# Patient Record
Sex: MALE | Race: WHITE | Employment: OTHER | URBAN - METROPOLITAN AREA
[De-identification: names, ages, dates, MRNs, and addresses within clinical notes are randomized per-mention and may not be internally consistent; named-entity substitution may affect disease eponyms.]

---

## 2017-01-19 ENCOUNTER — HOSPITAL ENCOUNTER (OUTPATIENT)
Dept: PHYSICAL THERAPY | Age: 57
Discharge: HOME | End: 2017-01-19
Attending: FAMILY MEDICINE
Payer: MEDICARE

## 2017-01-19 DIAGNOSIS — M25.511 RIGHT SHOULDER PAIN, UNSPECIFIED CHRONICITY: ICD-10-CM

## 2017-01-19 PROCEDURE — G8985 CARRY GOAL STATUS: HCPCS

## 2017-01-19 PROCEDURE — 97161 PT EVAL LOW COMPLEX 20 MIN: CPT

## 2017-01-19 PROCEDURE — 97110 THERAPEUTIC EXERCISES: CPT

## 2017-01-19 PROCEDURE — G8984 CARRY CURRENT STATUS: HCPCS

## 2017-01-19 NOTE — PROGRESS NOTES
Ambulatory/Rehab Services H2 Model Falls Risk Assessment    Risk Factor Pts. ·   Confusion/Disorientation/Impulsivity  []    4 ·   Symptomatic Depression  []   2 ·   Altered Elimination  []   1 ·   Dizziness/Vertigo  []   1 ·   Gender (Male)  []   1 ·   Any administered antiepileptics (anticonvulsants):  []   2 ·   Any administered benzodiazepines:  []   1 ·   Visual Impairment (specify):  []   1 ·   Portable Oxygen Use  []   1 ·   Orthostatic ? BP  []   1 ·   History of Recent Falls (within 3 mos.)  [x]   5     Ability to Rise from Chair (choose one) Pts. ·   Ability to rise in a single movement  [x]   0 ·   Pushes up, successful in one attempt  []   1 ·   Multiple attempts, but successful  []   3 ·   Unable to rise without assistance  []   4   Total: (5 or greater = High Risk) 5     Falls Prevention Plan:   [x]                Physical Limitations to Exercise (specify): safe position for home exercise program    []                Mobility Assistance Device (type):   []                Exercise/Equipment Adaptation (specify):    ©2010 [facility] [address] [place] [address] Patent #[id number].  Federal Law prohibits the replication, distribution or use without written permission from [facility] of [address] [place] [address]  1/19/2017

## 2017-01-19 NOTE — PROGRESS NOTES
Rudy Héctor  : 1960 [facility] at [address]  [phone]   [phone]        OUTPATIENT PHYSICAL THERAPY:Initial Assessment 2017    ICD-10: Treatment Diagnosis: pain in right shoulder (M25.511)  Strain of unspecified muscle, fascia, and tendon at shoulder and upper arm level, right arm, sequela (B55.564S)  Precautions/Allergies:   Codeine   Fall Risk Score: 5 (? 5 = High Risk)  MD Orders: Evaluate and treat MEDICAL/REFERRING DIAGNOSIS:  Right shoulder pain, unspecified chronicity [M25.511]   DATE OF ONSET: 16  REFERRING PHYSICIAN: aMrcella Mercedes MD  RETURN PHYSICIAN APPOINTMENT: 17   Patient has attended 1 physical therapy session including initial evaluation. INITIAL ASSESSMENT:  Mr. Claudette Gunner presents with increased pain, decreased active and passive range of motion and decreased strength right shoulder status post fall on outstretched arm. He presents with postural deviations listed below but most significant - decreased right scapula upward rotation mobility. He presents with decreased independence with activities of daily living and performing overhead activities. He would benefit from skilled physical therapy in order to restore prior level of function and prevent future impairment. PROBLEM LIST (Impacting functional limitations):  1. Decreased Strength  2. Decreased ADL/Functional Activities  3. Increased Pain  4. Decreased Activity Tolerance  5. Decreased Flexibility/Joint Mobility  6. Decreased [place] with Home Exercise Program INTERVENTIONS PLANNED:  1. Bed Mobility  2. Cold  3. Electrical Stimulation  4. Family Education  5. Heat  6. Home Exercise Program (HEP)  7. Manual Therapy  8. Neuromuscular Re-education/Strengthening  9. Range of Motion (ROM)  10. Therapeutic Activites  11. Therapeutic Exercise/Strengthening  12.  Ultrasound (US)   TREATMENT PLAN:  Effective Dates: 17 TO 4/19/17. Frequency/Duration: 1 time a week for 6 weeks  GOALS: (Goals have been discussed and agreed upon with patient.)  Discharge Goals: Time Frame: 6 weeks  1. Patient will be independent with home exercise program within 6 weeks in order to improve independence with management of patient's symptoms and/or functional deficits. 2. Patient will improve their Disabilities of the Arm, Shoulder, and Hand questionnaire score by 15 scale points (23/55) within 6 weeks in order to demonstrate a minimally clinically important difference with improved function during instrumental activities of daily living. 3. Patient will improve pain free active range of motion right shoulder to 120 degrees forward elevation within 6 weeks in order to restore prior level of function. Rehabilitation Potential For Stated Goals: Good  Regarding Kaylah Anaya's therapy, I certify that the treatment plan above will be carried out by a therapist or under their direction. Thank you for this referral,  Ninfa Gunn PT     Referring Physician Signature: Stefanie Zimmer MD              Date                    The information in this section was collected on 1/19/17 (except where otherwise noted). HISTORY:   History of Present Injury/Illness (Reason for Referral):  Jag Piña notes he was walking in [place] [address] when he tripped over vine and fell on outstretched arm right. He notes pain and decreased mobility right shoulder since fall. Past Medical History/Comorbidities: back pain, hepatitis C, essential hypertension  Social History/Living Environment:   Jag Piña lives with his wife  Prior Level of Function/Work/Activity:  Jag Piña is not currently working, he enjoys shooting his guns  Dominant Side:         RIGHT  Personal Factors:          Sex:  male        Age:  61 y.o.   Current Medications:  Naproxen, norco, chantix, restoril, hydrodiuril, harvoni, tetanus-diphatheria toxids   Date Last Reviewed:  1/19/2017     Number of Personal Factors/Comorbidities that affect the Plan of Care: 0: LOW COMPLEXITY   EXAMINATION:   Observation/Orthostatic Postural Assessment:  Assessed 1/19/16        Francisco Stair sits with forward head and rounded shoulders which indicate tight anterior chest musculature, upper trapezius, and levator scapula and weak posterior scapula musculature and deep cervical flexors. Palpation:  Assessed 1/19/17         Francisco Stair is not tender to palpate right shoulder, scapula musculature,   ROM:  Assessed 1/19/27        Right shoulder active range of motion  Flexion 104 degrees  Abduction 80 degrees  External rotation 45 degrees with elbow by side    Right shoulder passive range of motion  Flexion 120 degrees  External rotation with arm abducted 45 degrees - 60 degrees    Left shoulder active range of motion  Flexion 150 degrees  Abduction 160 degrees  External rotation 70 degrees with elbow by side  Strength:  Assessed 1/19/17        4-/5 right shoulder all major muscle groups within available range of motion - pain with shoulder flexion, abduction and internal rotation   5/5 left shoulder all major muscle groups  Special Tests:  Assessed 1/19/17        spurlings - negative neutral, right, left    Positive right/negative left  Bess pricila  Neers  Painful arc  Biceps load  Empty can  Full can    Negative bilaterally  Rents  Drop arm  Neurological Screen: Assessed 1/19/17        Myotomes:  Right shoulder limited by pain         Dermatomes:  No numbness or tingling noted bilateral upper extremities         Reflexes:   Will assess as able   Functional Use of right arm:   Decreased independence with activities of daily living (bathing, washing hair, washing back, cooking, dressing) and decreased independence with reaching overhead right upper extremity   Balance:  Assessed 1/19/16  Good standing dynamic balance  Mental Status:  Assessed 1/19/17        Alert and oriented x 4  Vitals: will assess as able each session   Body Structures Involved:  1. Nerves  2. Bones  3. Joints  4. Muscles  5. Ligaments Body Functions Affected:  1. Sensory/Pain  2. Neuromusculoskeletal  3. Movement Related  4. Skin Related Activities and Participation Affected:  1. General Tasks and Demands  2. Self Care  3. Domestic Life  4. Interpersonal Interactions and Relationships  5. Community, Social and [place]   Number of elements that affect the Plan of Care: 1-2: LOW COMPLEXITY   CLINICAL PRESENTATION:   Presentation: Stable and uncomplicated: LOW COMPLEXITY   CLINICAL DECISION MAKING:   Outcome Measure: Assessed 1/19/17  Tool Used: Disabilities of the Arm, Shoulder and Hand (DASH) Questionnaire - Quick Version  Score:  Initial: 38/55     Interpretation of Score: The DASH is designed to measure the activities of daily living in person's with upper extremity dysfunction or pain. Each section is scored on a 1-5 scale, 5 representing the greatest disability. The scores of each section are added together for a total score of 55. Score 11 12-19 20-28 29-37 38-45 46-54 55   Modifier CH CI CJ CK CL CM CN     ? Carrying, Moving, and Handling Objects:     - CURRENT STATUS: CL - 60%-79% impaired, limited or restricted    - GOAL STATUS: CJ - 20%-39% impaired, limited or restricted    - D/C STATUS:  ---------------To be determined---------------      Medical Necessity:   · Patient is expected to demonstrate progress in strength, range of motion, coordination and functional technique to increase independence with pain free activities of daily living and carrying/moving objects. · Patient demonstrates good rehab potential due to higher previous functional level. Reason for Services/Other Comments:  · Patient continues to require skilled intervention due to increased pain with activities of daily living, performing overhead activities, and carrying/moving objects.    Use of outcome tool(s) and clinical judgement create a POC that gives a: Clear prediction of patient's progress: LOW COMPLEXITY            TREATMENT:   (In addition to Assessment/Re-Assessment sessions the following treatments were rendered)  Pre-treatment Symptoms/Complaints:  7/10 pain in right shoulder 104 degrees forward elevation  Pain: Initial:   Pain Intensity 1: 7  Pain Location 1: Shoulder  Pain Orientation 1: Right  Pain Intervention(s) 1: Exercise      (In addition to Assessment/Re-Assessment sessions the following treatments were rendered)  Therapeutic Exercise: ( ):  Exercises per grid below to improve mobility, strength and coordination. Required minimal visual and verbal cues to promote proper body alignment and promote proper body posture. Progressed complexity of movement as indicated. 1. Supine active assisted right shoulder external rotation with use of wand for improved mobility and strength 20 repetitions     2. Supine active assisted right shoulder forward elevation with use of wand for improved mobility and strength 10 repetitions     3. Sitting posterior shoulder and capsule stretch with right arm across chest 3 sets 30 seconds for improved mobility and decreased pain     Manual Therapy (     ):   Therapeutic Modalities:                                                                                                 Treatment/Session Assessment:    Response to Treatment:  Abdulkadir Arevalo notes same level of pain but improved active forward elevation right shoulder to 120 degrees. Pain: Post Treatment Session: 7/10  · Compliance with Program/Exercises: Will assess as treatment progresses. · Recommendations/Intent for next treatment session: \"Next visit will focus on advancements to more challenging activities and manual therapy for improved mobility and decreased pain\".   Total Treatment Duration:  PT Patient Time In/Time Out  Time In: 0930  Time Out: 1110 [address] [place] [address], PT

## 2017-01-25 ENCOUNTER — HOSPITAL ENCOUNTER (OUTPATIENT)
Dept: PHYSICAL THERAPY | Age: 57
Discharge: HOME | End: 2017-01-25
Attending: FAMILY MEDICINE
Payer: MEDICARE

## 2017-01-25 PROCEDURE — 97110 THERAPEUTIC EXERCISES: CPT

## 2017-01-25 PROCEDURE — 97140 MANUAL THERAPY 1/> REGIONS: CPT

## 2017-01-25 NOTE — PROGRESS NOTES
Rudy Héctor  : 1960 [facility] at [address]  [phone]   [phone]        OUTPATIENT PHYSICAL THERAPY:Daily Note 2017    ICD-10: Treatment Diagnosis: pain in right shoulder (M25.511)  Strain of unspecified muscle, fascia, and tendon at shoulder and upper arm level, right arm, sequela (Z29.560X)  Precautions/Allergies:   Codeine   Fall Risk Score: 5 (? 5 = High Risk)  MD Orders: Evaluate and treat MEDICAL/REFERRING DIAGNOSIS:  Right shoulder pain, unspecified chronicity [M25.511]   DATE OF ONSET: 16  REFERRING PHYSICIAN: Marcella Mercedes MD  RETURN PHYSICIAN APPOINTMENT: 17   Patient has attended 2 physical therapy sessions including initial evaluation. INITIAL ASSESSMENT:  Mr. Claudette Gunner presents with increased pain, decreased active and passive range of motion and decreased strength right shoulder status post fall on outstretched arm. He presents with postural deviations listed below but most significant - decreased right scapula upward rotation mobility. He presents with decreased independence with activities of daily living and performing overhead activities. He would benefit from skilled physical therapy in order to restore prior level of function and prevent future impairment. PROBLEM LIST (Impacting functional limitations):  1. Decreased Strength  2. Decreased ADL/Functional Activities  3. Increased Pain  4. Decreased Activity Tolerance  5. Decreased Flexibility/Joint Mobility  6. Decreased [place] with Home Exercise Program INTERVENTIONS PLANNED:  1. Bed Mobility  2. Cold  3. Electrical Stimulation  4. Family Education  5. Heat  6. Home Exercise Program (HEP)  7. Manual Therapy  8. Neuromuscular Re-education/Strengthening  9. Range of Motion (ROM)  10. Therapeutic Activites  11. Therapeutic Exercise/Strengthening  12. Ultrasound (US)   TREATMENT PLAN:  Effective Dates: 17 TO 17. Frequency/Duration: 1 time a week for 6 weeks  GOALS: (Goals have been discussed and agreed upon with patient.)  Discharge Goals: Time Frame: 6 weeks  1. Patient will be independent with home exercise program within 6 weeks in order to improve independence with management of patient's symptoms and/or functional deficits. 2. Patient will improve their Disabilities of the Arm, Shoulder, and Hand questionnaire score by 15 scale points (23/55) within 6 weeks in order to demonstrate a minimally clinically important difference with improved function during instrumental activities of daily living. 3. Patient will improve pain free active range of motion right shoulder to 120 degrees forward elevation within 6 weeks in order to restore prior level of function. Rehabilitation Potential For Stated Goals: Good              The information in this section was collected on 1/19/17 (except where otherwise noted). HISTORY:   History of Present Injury/Illness (Reason for Referral):  Fermin Guerrero notes he [place] [address] when he tripped over vine and fell on outstretched arm right. He notes pain and decreased mobility right shoulder since fall. Past Medical History/Comorbidities: back pain, hepatitis C, essential hypertension  Social History/Living Environment:   Fermin Guerrero lives with his wife  Prior Level of Function/Work/Activity:  Fermin Guerrero is not currently working, he enjoys shooting his guns  Dominant Side:         RIGHT  Personal Factors:          Sex:  male        Age:  61 y.o.   Current Medications:  Naproxen, norco, chantix, restoril, hydrodiuril, harvoni, tetanus-diphatheria toxids   Date Last Reviewed:  1/25/2017     Number of Personal Factors/Comorbidities that affect the Plan of Care: 0: LOW COMPLEXITY   EXAMINATION:   Observation/Orthostatic Postural Assessment:  Assessed 1/19/16        Fermin Guerrero sits with forward head and rounded shoulders which indicate tight anterior chest musculature, upper trapezius, and levator scapula and weak posterior scapula musculature and deep cervical flexors. Palpation:  Assessed 1/19/17         Rose Rascon is not tender to palpate right shoulder, scapula musculature,   ROM:  Assessed 1/19/27        Right shoulder active range of motion  Flexion 104 degrees  Abduction 80 degrees  External rotation 45 degrees with elbow by side    Right shoulder passive range of motion  Flexion 120 degrees  External rotation with arm abducted 45 degrees - 60 degrees    Left shoulder active range of motion  Flexion 150 degrees  Abduction 160 degrees  External rotation 70 degrees with elbow by side  Strength:  Assessed 1/19/17        4-/5 right shoulder all major muscle groups within available range of motion - pain with shoulder flexion, abduction and internal rotation   5/5 left shoulder all major muscle groups  Special Tests:  Assessed 1/19/17        spurlings - negative neutral, right, left    Positive right/negative left  Bess pricila  Neers  Painful arc  Biceps load  Empty can  Full can    Negative bilaterally  Rents  Drop arm  Neurological Screen: Assessed 1/19/17        Myotomes:  Right shoulder limited by pain         Dermatomes:  No numbness or tingling noted bilateral upper extremities         Reflexes: Will assess as able   Functional Use of right arm:   Decreased independence with activities of daily living (bathing, washing hair, washing back, cooking, dressing) and decreased independence with reaching overhead right upper extremity   Balance:  Assessed 1/19/16  Good standing dynamic balance  Mental Status:  Assessed 1/19/17        Alert and oriented x 4  Vitals: will assess as able each session   Body Structures Involved:  1. Nerves  2. Bones  3. Joints  4. Muscles  5. Ligaments Body Functions Affected:  1. Sensory/Pain  2. Neuromusculoskeletal  3. Movement Related  4. Skin Related Activities and Participation Affected:  1.  General Tasks and Demands  2. Self Care  3. Domestic Life  4. Interpersonal Interactions and Relationships  5. Community, Social and [place]   Number of elements that affect the Plan of Care: 1-2: LOW COMPLEXITY   CLINICAL PRESENTATION:   Presentation: Stable and uncomplicated: LOW COMPLEXITY   CLINICAL DECISION MAKING:   Outcome Measure: Assessed 1/19/17  Tool Used: Disabilities of the Arm, Shoulder and Hand (DASH) Questionnaire - Quick Version  Score:  Initial: 38/55     Interpretation of Score: The DASH is designed to measure the activities of daily living in person's with upper extremity dysfunction or pain. Each section is scored on a 1-5 scale, 5 representing the greatest disability. The scores of each section are added together for a total score of 55. Score 11 12-19 20-28 29-37 38-45 46-54 55   Modifier CH CI CJ CK CL CM CN     ? Carrying, Moving, and Handling Objects:     - CURRENT STATUS: CL - 60%-79% impaired, limited or restricted    - GOAL STATUS: CJ - 20%-39% impaired, limited or restricted    - D/C STATUS:  ---------------To be determined---------------      Medical Necessity:   · Patient is expected to demonstrate progress in strength, range of motion, coordination and functional technique to increase independence with pain free activities of daily living and carrying/moving objects. · Patient demonstrates good rehab potential due to higher previous functional level. Reason for Services/Other Comments:  · Patient continues to require skilled intervention due to increased pain with activities of daily living, performing overhead activities, and carrying/moving objects.    Use of outcome tool(s) and clinical judgement create a POC that gives a: Clear prediction of patient's progress: LOW COMPLEXITY            TREATMENT:   (In addition to Assessment/Re-Assessment sessions the following treatments were rendered)  Pre-treatment Symptoms/Complaints:  5/10 pain in right shoulder 150 degrees forward elevation  Pain: Initial:   Pain Intensity 1: 5  Pain Location 1: Shoulder  Pain Orientation 1: Right  Pain Intervention(s) 1: Exercise, Therapeutic touch      (In addition to Assessment/Re-Assessment sessions the following treatments were rendered)  Therapeutic Exercise: ( 33 minutes):  Exercises per grid below to improve mobility, strength and coordination. Required minimal visual and verbal cues to promote proper body alignment and promote proper body posture. Progressed complexity of movement as indicated. 1. Supine active assisted right shoulder external rotation with use of wand for improved mobility and strength 20 repetitions     2. Supine active assisted right shoulder forward elevation with use of wand for improved mobility and strength 10 repetitions     3. Sitting posterior shoulder and capsule stretch with right arm across chest 3 sets 30 seconds for improved mobility and decreased pain     4. UBE for close chain strengthening and improved activity tolerance with functional use of right upper extremity 8 minutes  4 minutes forward, 4 minutes backward level 0    5. Sitting pulleys for improved active assisted mobility right shoulder forward elevation 2 minutes     6. Standing scapula retractions and shoulder extension with straight arm with yellow theraband resistance for improved postural strength and alignment 20 repetitions each     Manual Therapy (     12 minutes): Soft tissue mobilization to right lateral upper arm and anterior shoulder for improved mobility and muscle relaxation     Therapeutic Modalities:                                                                                                 Treatment/Session Assessment:    Response to Treatment:  Giovanni Hahn notes 5/10 pain but improved range of motion with 160 degrees forward elevation   Pain: Post Treatment Session: 5/10  · Compliance with Program/Exercises:  Will assess as treatment progresses. · Recommendations/Intent for next treatment session: \"Next visit will focus on advancements to more challenging activities and manual therapy for improved mobility and decreased pain\".   Total Treatment Duration:  PT Patient Time In/Time Out  Time In: 1300  Time Out: JIM Cullen

## 2017-03-20 NOTE — PROGRESS NOTES
Praneeth Mcgowan  : 1960 [facility] [address]  [phone]   [phone]        OUTPATIENT PHYSICAL THERAPY:Discontinuation 3/20/2017    ICD-10: Treatment Diagnosis: pain in right shoulder (M25.511)  Strain of unspecified muscle, fascia, and tendon at shoulder and upper arm level, right arm, sequela (H82.707D)  Precautions/Allergies:   Codeine   Fall Risk Score: 5 (? 5 = High Risk)  MD Orders: Evaluate and treat MEDICAL/REFERRING DIAGNOSIS:  Right shoulder pain, unspecified chronicity [M25.511]   DATE OF ONSET: 16  REFERRING PHYSICIAN: José Miguel Hoskins MD  RETURN PHYSICIAN APPOINTMENT: 17   Patient has attended 2 physical therapy sessions including initial evaluation. INITIAL ASSESSMENT:  Mr. José Miguel Nguyen to be discontinued from physical therapy due to not attending therapy since 17. PROBLEM LIST (Impacting functional limitations):  1. Decreased Strength  2. Decreased ADL/Functional Activities  3. Increased Pain  4. Decreased Activity Tolerance  5. Decreased Flexibility/Joint Mobility  6. Decreased [place] with Home Exercise Program INTERVENTIONS PLANNED:  1. Bed Mobility  2. Cold  3. Electrical Stimulation  4. Family Education  5. Heat  6. Home Exercise Program (HEP)  7. Manual Therapy  8. Neuromuscular Re-education/Strengthening  9. Range of Motion (ROM)  10. Therapeutic Activites  11. Therapeutic Exercise/Strengthening  12. Ultrasound (US)   TREATMENT PLAN:  Effective Dates: 17 TO 17. Frequency/Duration:   GOALS: (Goals have been discussed and agreed upon with patient.)  Discharge Goals: Time Frame: 6 weeks  1. Patient will be independent with home exercise program within 6 weeks in order to improve independence with management of patient's symptoms and/or functional deficits. (NOT MET due to NOT ASSESSED 3/20/17)  2.  Patient will improve their Disabilities of the Arm, Shoulder, and Hand questionnaire score by 15 scale points (23/55) within 6 weeks in order to demonstrate a minimally clinically important difference with improved function during instrumental activities of daily living. (NOT MET due to NOT ASSESSED 3/20/17)  3. Patient will improve pain free active range of motion right shoulder to 120 degrees forward elevation within 6 weeks in order to restore prior level of function. (NOT MET due to NOT ASSESSED 3/20/17)  Rehabilitation Potential For Stated Goals: Good              The information in this section was collected on 1/19/17 (except where otherwise noted). HISTORY:   History of Present Injury/Illness (Reason for Referral):  Ruslan Alvarez notes he was walking in woods on [place] when he tripped over vine and fell on outstretched arm right. He notes pain and decreased mobility right shoulder since fall. Past Medical History/Comorbidities: back pain, hepatitis C, essential hypertension  Social History/Living Environment:   Ruslan Alvarez lives with his wife  Prior Level of Function/Work/Activity:  Ruslan Alvarez is not currently working, he enjoys shooting his guns  Dominant Side:         RIGHT  Personal Factors:          Sex:  male        Age:  61 y.o. Current Medications:  Naproxen, norco, chantix, restoril, hydrodiuril, harvoni, tetanus-diphatheria toxids   Date Last Reviewed:  3/20/2017     Number of Personal Factors/Comorbidities that affect the Plan of Care: 0: LOW COMPLEXITY   EXAMINATION:   Observation/Orthostatic Postural Assessment:  Assessed 1/19/16        Ruslan Alvarez sits with forward head and rounded shoulders which indicate tight anterior chest musculature, upper trapezius, and levator scapula and weak posterior scapula musculature and deep cervical flexors.    Palpation:  Assessed 1/19/17         Ruslan Alvarez is not tender to palpate right shoulder, scapula musculature,   ROM:  Assessed 1/19/27        Right shoulder active range of motion  Flexion 104 degrees  Abduction 80 degrees  External rotation 45 degrees with elbow by side    Right shoulder passive range of motion  Flexion 120 degrees  External rotation with arm abducted 45 degrees - 60 degrees    Left shoulder active range of motion  Flexion 150 degrees  Abduction 160 degrees  External rotation 70 degrees with elbow by side  Strength:  Assessed 1/19/17        4-/5 right shoulder all major muscle groups within available range of motion - pain with shoulder flexion, abduction and internal rotation   5/5 left shoulder all major muscle groups  Special Tests:  Assessed 1/19/17        spurlings - negative neutral, right, left    Positive right/negative left  Bess pricila  Neers  Painful arc  Biceps load  Empty can  Full can    Negative bilaterally  Rents  Drop arm  Neurological Screen: Assessed 1/19/17        Myotomes:  Right shoulder limited by pain         Dermatomes:  No numbness or tingling noted bilateral upper extremities         Reflexes: Will assess as able   Functional Use of right arm:   Decreased independence with activities of daily living (bathing, washing hair, washing back, cooking, dressing) and decreased independence with reaching overhead right upper extremity   Balance:  Assessed 1/19/16  Good standing dynamic balance  Mental Status:  Assessed 1/19/17        Alert and oriented x 4  Vitals: will assess as able each session   Body Structures Involved:  1. Nerves  2. Bones  3. Joints  4. Muscles  5. Ligaments Body Functions Affected:  1. Sensory/Pain  2. Neuromusculoskeletal  3. Movement Related  4. Skin Related Activities and Participation Affected:  1. General Tasks and Demands  2. Self Care  3. Domestic Life  4. Interpersonal Interactions and Relationships  5.  Community, Social and [place]   Number of elements that affect the Plan of Care: 1-2: LOW COMPLEXITY   CLINICAL PRESENTATION:   Presentation: Stable and uncomplicated: LOW COMPLEXITY   CLINICAL DECISION MAKING: Outcome Measure: Assessed 1/19/17  Tool Used: Disabilities of the Arm, Shoulder and Hand (DASH) Questionnaire - Quick Version  Score:  Initial: 38/55     Interpretation of Score: The DASH is designed to measure the activities of daily living in person's with upper extremity dysfunction or pain. Each section is scored on a 1-5 scale, 5 representing the greatest disability. The scores of each section are added together for a total score of 55. Score 11 12-19 20-28 29-37 38-45 46-54 55   Modifier CH CI CJ CK CL CM CN     ? Carrying, Moving, and Handling Objects:     - CURRENT STATUS: CL - 60%-79% impaired, limited or restricted    - GOAL STATUS: CJ - 20%-39% impaired, limited or restricted    - D/C STATUS:  ---------------To be determined---------------      Medical Necessity:   · Patient is expected to demonstrate progress in strength, range of motion, coordination and functional technique to increase independence with pain free activities of daily living and carrying/moving objects. · Patient demonstrates good rehab potential due to higher previous functional level. Reason for Services/Other Comments:  · Patient continues to require skilled intervention due to increased pain with activities of daily living, performing overhead activities, and carrying/moving objects.    Use of outcome tool(s) and clinical judgement create a POC that gives a: Clear prediction of patient's progress: LOW COMPLEXITY            TREATMENT:     Faustino Mireles, PT

## 2017-07-25 PROCEDURE — 88305 TISSUE EXAM BY PATHOLOGIST: CPT | Performed by: FAMILY MEDICINE

## 2017-07-26 ENCOUNTER — HOSPITAL ENCOUNTER (OUTPATIENT)
Dept: LAB | Age: 57
Discharge: HOME | End: 2017-07-26

## 2018-06-19 ENCOUNTER — HOSPITAL ENCOUNTER (OUTPATIENT)
Dept: LAB | Age: 58
Discharge: HOME | End: 2018-06-19

## 2018-06-19 PROCEDURE — 88305 TISSUE EXAM BY PATHOLOGIST: CPT | Performed by: INTERNAL MEDICINE

## 2025-01-17 ENCOUNTER — INITIAL CONSULT (OUTPATIENT)
Age: 65
End: 2025-01-17
Payer: MEDICARE

## 2025-01-17 VITALS
BODY MASS INDEX: 33.03 KG/M2 | DIASTOLIC BLOOD PRESSURE: 68 MMHG | WEIGHT: 223 LBS | SYSTOLIC BLOOD PRESSURE: 120 MMHG | HEART RATE: 71 BPM | HEIGHT: 69 IN

## 2025-01-17 DIAGNOSIS — I25.10 ATHEROSCLEROSIS OF NATIVE CORONARY ARTERY OF NATIVE HEART WITHOUT ANGINA PECTORIS: Primary | ICD-10-CM

## 2025-01-17 DIAGNOSIS — E78.2 MIXED HYPERLIPIDEMIA: ICD-10-CM

## 2025-01-17 PROCEDURE — 3078F DIAST BP <80 MM HG: CPT | Performed by: INTERNAL MEDICINE

## 2025-01-17 PROCEDURE — 4004F PT TOBACCO SCREEN RCVD TLK: CPT | Performed by: INTERNAL MEDICINE

## 2025-01-17 PROCEDURE — 3074F SYST BP LT 130 MM HG: CPT | Performed by: INTERNAL MEDICINE

## 2025-01-17 PROCEDURE — 1123F ACP DISCUSS/DSCN MKR DOCD: CPT | Performed by: INTERNAL MEDICINE

## 2025-01-17 PROCEDURE — 3017F COLORECTAL CA SCREEN DOC REV: CPT | Performed by: INTERNAL MEDICINE

## 2025-01-17 PROCEDURE — 93000 ELECTROCARDIOGRAM COMPLETE: CPT | Performed by: INTERNAL MEDICINE

## 2025-01-17 PROCEDURE — G8427 DOCREV CUR MEDS BY ELIG CLIN: HCPCS | Performed by: INTERNAL MEDICINE

## 2025-01-17 PROCEDURE — G8417 CALC BMI ABV UP PARAM F/U: HCPCS | Performed by: INTERNAL MEDICINE

## 2025-01-17 PROCEDURE — 99204 OFFICE O/P NEW MOD 45 MIN: CPT | Performed by: INTERNAL MEDICINE

## 2025-01-17 RX ORDER — ATORVASTATIN CALCIUM 20 MG/1
20 TABLET, FILM COATED ORAL DAILY
Qty: 90 TABLET | Refills: 3 | Status: SHIPPED | OUTPATIENT
Start: 2025-01-17

## 2025-01-17 ASSESSMENT — ENCOUNTER SYMPTOMS
SHORTNESS OF BREATH: 0
ABDOMINAL PAIN: 0

## 2025-01-17 NOTE — PROGRESS NOTES
any further testing at this time.  We discussed the importance of aggressive risk factor modification.  Smoking cessation will be the most important although probably the most difficult.  His cholesterol is slightly above goal, we will start him on atorvastatin with a goal LDL closer to 70.  Like to see him back in 6 months with repeat lipid panel.  Hopefully he will cut back on his cigarette intake    No follow-ups on file.            Thank you for allowing me to participate in this patient's care.  Please call or contact me if there are any questions or concerns regarding the above.      Personal time spent with chart review, interview/physical examination, documentation and coordination of care totals 47 minutes.     Oumar Alexandra III, MD  01/17/25  10:36 AM

## 2025-08-15 DIAGNOSIS — I25.10 ATHEROSCLEROSIS OF NATIVE CORONARY ARTERY OF NATIVE HEART WITHOUT ANGINA PECTORIS: ICD-10-CM

## 2025-08-15 LAB
CHOLEST SERPL-MCNC: 125 MG/DL (ref 0–200)
HDLC SERPL-MCNC: 38 MG/DL (ref 40–60)
HDLC SERPL: 3.3 (ref 0–5)
LDLC SERPL CALC-MCNC: 70 MG/DL (ref 0–100)
TRIGL SERPL-MCNC: 88 MG/DL (ref 0–150)
VLDLC SERPL CALC-MCNC: 18 MG/DL (ref 6–23)

## 2025-08-20 ENCOUNTER — OFFICE VISIT (OUTPATIENT)
Age: 65
End: 2025-08-20
Payer: MEDICARE

## 2025-08-20 VITALS
WEIGHT: 221 LBS | HEART RATE: 72 BPM | DIASTOLIC BLOOD PRESSURE: 62 MMHG | BODY MASS INDEX: 32.73 KG/M2 | HEIGHT: 69 IN | SYSTOLIC BLOOD PRESSURE: 122 MMHG

## 2025-08-20 DIAGNOSIS — I25.10 ATHEROSCLEROSIS OF NATIVE CORONARY ARTERY OF NATIVE HEART WITHOUT ANGINA PECTORIS: ICD-10-CM

## 2025-08-20 DIAGNOSIS — E78.2 MIXED HYPERLIPIDEMIA: Primary | ICD-10-CM

## 2025-08-20 PROCEDURE — 3017F COLORECTAL CA SCREEN DOC REV: CPT | Performed by: INTERNAL MEDICINE

## 2025-08-20 PROCEDURE — 99214 OFFICE O/P EST MOD 30 MIN: CPT | Performed by: INTERNAL MEDICINE

## 2025-08-20 PROCEDURE — G8417 CALC BMI ABV UP PARAM F/U: HCPCS | Performed by: INTERNAL MEDICINE

## 2025-08-20 PROCEDURE — G8428 CUR MEDS NOT DOCUMENT: HCPCS | Performed by: INTERNAL MEDICINE

## 2025-08-20 PROCEDURE — 3074F SYST BP LT 130 MM HG: CPT | Performed by: INTERNAL MEDICINE

## 2025-08-20 PROCEDURE — 3078F DIAST BP <80 MM HG: CPT | Performed by: INTERNAL MEDICINE

## 2025-08-20 PROCEDURE — 1123F ACP DISCUSS/DSCN MKR DOCD: CPT | Performed by: INTERNAL MEDICINE

## 2025-08-20 PROCEDURE — 4004F PT TOBACCO SCREEN RCVD TLK: CPT | Performed by: INTERNAL MEDICINE

## 2025-08-20 ASSESSMENT — ENCOUNTER SYMPTOMS
ABDOMINAL PAIN: 0
SHORTNESS OF BREATH: 0